# Patient Record
Sex: FEMALE | HISPANIC OR LATINO
[De-identification: names, ages, dates, MRNs, and addresses within clinical notes are randomized per-mention and may not be internally consistent; named-entity substitution may affect disease eponyms.]

---

## 2023-09-21 ENCOUNTER — APPOINTMENT (OUTPATIENT)
Dept: OBGYN | Facility: CLINIC | Age: 33
End: 2023-09-21
Payer: COMMERCIAL

## 2023-09-21 VITALS
WEIGHT: 173 LBS | HEART RATE: 67 BPM | BODY MASS INDEX: 33.96 KG/M2 | HEIGHT: 60 IN | DIASTOLIC BLOOD PRESSURE: 72 MMHG | SYSTOLIC BLOOD PRESSURE: 105 MMHG

## 2023-09-21 DIAGNOSIS — Z78.9 OTHER SPECIFIED HEALTH STATUS: ICD-10-CM

## 2023-09-21 DIAGNOSIS — N83.202 UNSPECIFIED OVARIAN CYST, LEFT SIDE: ICD-10-CM

## 2023-09-21 PROBLEM — Z00.00 ENCOUNTER FOR PREVENTIVE HEALTH EXAMINATION: Status: ACTIVE | Noted: 2023-09-21

## 2023-09-21 PROCEDURE — 99204 OFFICE O/P NEW MOD 45 MIN: CPT

## 2023-09-25 LAB
CANCER AG125 SERPL-ACNC: 14 U/ML
CANCER AG19-9 SERPL-ACNC: 2 U/ML
CEA SERPL-MCNC: <0.6 NG/ML

## 2023-09-27 LAB — HE4X: 44.3 PMOL/L

## 2024-02-07 ENCOUNTER — APPOINTMENT (OUTPATIENT)
Dept: OBGYN | Facility: CLINIC | Age: 34
End: 2024-02-07

## 2024-02-09 ENCOUNTER — NON-APPOINTMENT (OUTPATIENT)
Age: 34
End: 2024-02-09

## 2024-02-23 ENCOUNTER — APPOINTMENT (OUTPATIENT)
Dept: OBGYN | Facility: HOSPITAL | Age: 34
End: 2024-02-23

## 2024-03-14 ENCOUNTER — APPOINTMENT (OUTPATIENT)
Dept: OBGYN | Facility: CLINIC | Age: 34
End: 2024-03-14

## 2025-03-07 ENCOUNTER — EMERGENCY (EMERGENCY)
Facility: HOSPITAL | Age: 35
LOS: 1 days | Discharge: HOME | End: 2025-03-07
Attending: EMERGENCY MEDICINE
Payer: MEDICAID

## 2025-03-07 VITALS
SYSTOLIC BLOOD PRESSURE: 132 MMHG | TEMPERATURE: 97 F | HEART RATE: 72 BPM | WEIGHT: 169.98 LBS | RESPIRATION RATE: 16 BRPM | DIASTOLIC BLOOD PRESSURE: 76 MMHG | OXYGEN SATURATION: 97 %

## 2025-03-07 VITALS
RESPIRATION RATE: 18 BRPM | HEART RATE: 78 BPM | TEMPERATURE: 98 F | SYSTOLIC BLOOD PRESSURE: 117 MMHG | DIASTOLIC BLOOD PRESSURE: 85 MMHG | OXYGEN SATURATION: 98 %

## 2025-03-07 DIAGNOSIS — Z98.890 OTHER SPECIFIED POSTPROCEDURAL STATES: Chronic | ICD-10-CM

## 2025-03-07 LAB
ALBUMIN SERPL ELPH-MCNC: 3.9 G/DL — SIGNIFICANT CHANGE UP (ref 3.5–5)
ALP SERPL-CCNC: 72 U/L — SIGNIFICANT CHANGE UP (ref 40–120)
ALT FLD-CCNC: 65 U/L DA — HIGH (ref 10–60)
ANION GAP SERPL CALC-SCNC: 7 MMOL/L — SIGNIFICANT CHANGE UP (ref 5–17)
AST SERPL-CCNC: 26 U/L — SIGNIFICANT CHANGE UP (ref 10–40)
BASOPHILS # BLD AUTO: 0.02 K/UL — SIGNIFICANT CHANGE UP (ref 0–0.2)
BASOPHILS NFR BLD AUTO: 0.3 % — SIGNIFICANT CHANGE UP (ref 0–2)
BILIRUB SERPL-MCNC: 0.3 MG/DL — SIGNIFICANT CHANGE UP (ref 0.2–1.2)
BLD GP AB SCN SERPL QL: SIGNIFICANT CHANGE UP
BUN SERPL-MCNC: 9 MG/DL — SIGNIFICANT CHANGE UP (ref 7–18)
CALCIUM SERPL-MCNC: 9 MG/DL — SIGNIFICANT CHANGE UP (ref 8.4–10.5)
CHLORIDE SERPL-SCNC: 105 MMOL/L — SIGNIFICANT CHANGE UP (ref 96–108)
CO2 SERPL-SCNC: 26 MMOL/L — SIGNIFICANT CHANGE UP (ref 22–31)
CREAT SERPL-MCNC: 0.51 MG/DL — SIGNIFICANT CHANGE UP (ref 0.5–1.3)
EGFR: 126 ML/MIN/1.73M2 — SIGNIFICANT CHANGE UP
EOSINOPHIL # BLD AUTO: 0.23 K/UL — SIGNIFICANT CHANGE UP (ref 0–0.5)
EOSINOPHIL NFR BLD AUTO: 3 % — SIGNIFICANT CHANGE UP (ref 0–6)
GLUCOSE SERPL-MCNC: 111 MG/DL — HIGH (ref 70–99)
HCG SERPL-ACNC: HIGH MIU/ML
HCT VFR BLD CALC: 36.4 % — SIGNIFICANT CHANGE UP (ref 34.5–45)
HGB BLD-MCNC: 12 G/DL — SIGNIFICANT CHANGE UP (ref 11.5–15.5)
IMM GRANULOCYTES NFR BLD AUTO: 0.3 % — SIGNIFICANT CHANGE UP (ref 0–0.9)
LYMPHOCYTES # BLD AUTO: 1.7 K/UL — SIGNIFICANT CHANGE UP (ref 1–3.3)
LYMPHOCYTES # BLD AUTO: 22.1 % — SIGNIFICANT CHANGE UP (ref 13–44)
MCHC RBC-ENTMCNC: 28.6 PG — SIGNIFICANT CHANGE UP (ref 27–34)
MCHC RBC-ENTMCNC: 33 G/DL — SIGNIFICANT CHANGE UP (ref 32–36)
MCV RBC AUTO: 86.7 FL — SIGNIFICANT CHANGE UP (ref 80–100)
MONOCYTES # BLD AUTO: 0.57 K/UL — SIGNIFICANT CHANGE UP (ref 0–0.9)
MONOCYTES NFR BLD AUTO: 7.4 % — SIGNIFICANT CHANGE UP (ref 2–14)
NEUTROPHILS # BLD AUTO: 5.16 K/UL — SIGNIFICANT CHANGE UP (ref 1.8–7.4)
NEUTROPHILS NFR BLD AUTO: 66.9 % — SIGNIFICANT CHANGE UP (ref 43–77)
NRBC BLD AUTO-RTO: 0 /100 WBCS — SIGNIFICANT CHANGE UP (ref 0–0)
PLATELET # BLD AUTO: 195 K/UL — SIGNIFICANT CHANGE UP (ref 150–400)
POTASSIUM SERPL-MCNC: 3.8 MMOL/L — SIGNIFICANT CHANGE UP (ref 3.5–5.3)
POTASSIUM SERPL-SCNC: 3.8 MMOL/L — SIGNIFICANT CHANGE UP (ref 3.5–5.3)
PROT SERPL-MCNC: 7.6 G/DL — SIGNIFICANT CHANGE UP (ref 6–8.3)
RBC # BLD: 4.2 M/UL — SIGNIFICANT CHANGE UP (ref 3.8–5.2)
RBC # FLD: 12.8 % — SIGNIFICANT CHANGE UP (ref 10.3–14.5)
SODIUM SERPL-SCNC: 138 MMOL/L — SIGNIFICANT CHANGE UP (ref 135–145)
WBC # BLD: 7.7 K/UL — SIGNIFICANT CHANGE UP (ref 3.8–10.5)
WBC # FLD AUTO: 7.7 K/UL — SIGNIFICANT CHANGE UP (ref 3.8–10.5)

## 2025-03-07 PROCEDURE — 76817 TRANSVAGINAL US OBSTETRIC: CPT | Mod: 26

## 2025-03-07 PROCEDURE — 86900 BLOOD TYPING SEROLOGIC ABO: CPT

## 2025-03-07 PROCEDURE — 76801 OB US < 14 WKS SINGLE FETUS: CPT

## 2025-03-07 PROCEDURE — 86901 BLOOD TYPING SEROLOGIC RH(D): CPT

## 2025-03-07 PROCEDURE — 76817 TRANSVAGINAL US OBSTETRIC: CPT

## 2025-03-07 PROCEDURE — 36415 COLL VENOUS BLD VENIPUNCTURE: CPT

## 2025-03-07 PROCEDURE — 85025 COMPLETE CBC W/AUTO DIFF WBC: CPT

## 2025-03-07 PROCEDURE — 80053 COMPREHEN METABOLIC PANEL: CPT

## 2025-03-07 PROCEDURE — 99284 EMERGENCY DEPT VISIT MOD MDM: CPT

## 2025-03-07 PROCEDURE — 86850 RBC ANTIBODY SCREEN: CPT

## 2025-03-07 PROCEDURE — 99284 EMERGENCY DEPT VISIT MOD MDM: CPT | Mod: 25

## 2025-03-07 PROCEDURE — 76801 OB US < 14 WKS SINGLE FETUS: CPT | Mod: 26

## 2025-03-07 PROCEDURE — 84702 CHORIONIC GONADOTROPIN TEST: CPT

## 2025-03-07 NOTE — ED PROVIDER NOTE - CHPI ED SYMPTOMS NEG
no abdominal pain/no back pain/no discharge/no dysuria/no fever/no nausea/no pain/no vaginal discharge/no chills

## 2025-03-07 NOTE — ED PROVIDER NOTE - OBJECTIVE STATEMENT
Patient is a 34-year-old female who was told 2 weeks ago that she is pregnant no sonogram was done at that time patient now complaining of vaginal bleeding passed a clot no pain currently no nausea no vomiting patient is  1 para 0.  History of of excision of ovarian cyst

## 2025-03-07 NOTE — ED PROVIDER NOTE - CLINICAL SUMMARY MEDICAL DECISION MAKING FREE TEXT BOX
Patient states that she was pregnant patient now complaining of vaginal bleeding mild no cramps no nausea no vomiting will obtain CBC CMP type and screen will check pregnancy test will reevaluate after pregnancy test obtain Patient states that she was pregnant patient now complaining of vaginal bleeding mild no cramps no nausea no vomiting will obtain CBC CMP type and screen will check pregnancy test will reevaluate after pregnancy test obtain. patient is pregnant will get ultrasound

## 2025-03-07 NOTE — ED PROVIDER NOTE - NSFOLLOWUPINSTRUCTIONS_ED_ALL_ED_FT
follow up with ob gyn        Log Out.  [employer] CareNotes®  :  [facility]        ACUTE DIARRHEA - Ambulatory Care    Diarrea aguda    CUIDADO AMBULATORIO:    La diarrea agudacomienza rápidamente y dura poco tiempo, usualmente de 1 a 3 días. Puede durar hasta 2 semanas.    Signos y síntomas que pueden ocurrir con la diarrea:Que tenga 3 o más episodios de diarrea. Puede ser difícil de controlar quiroz diarrea. Usted también podría presentar alguno de los siguientes:    Fiebre y escalofríos    Dolor de reginaldo o dolor abdominal    Náuseas y vómitos    Síntomas de deshidratación aayush sed, disminución de la orina, piel seca, ojos hundidos, o latido cardíaco rápido y alan  Busque atención médica de inmediato si:    Se siente confundido.    Quiroz ritmo cardíaco es más rápido de lo normal.    Yasmin ojos se danny demasiado hundidos o no le salen lágrimas cuando llora.    Usted orina menos de lo normal o quiroz orina es de color amarillo oscuro.    Usted tiene bethany o mucosidad en yasmin movimientos intestinales.    Usted tiene dolor abdominal intenso.    Usted no puede jensen ningún líquido.  Comuníquese con quiroz médico si:    Yasmin síntomas no mejoran con el tratamiento.    Tiene fiebre que supera los 101.3°F (38.5°C).    Tiene dificultad para ingerir alimentos y líquidos porque tiene vómitos.    Quiroz diarrea no mejora dentro de 7 días.    Usted tiene preguntas o inquietudes acerca de quiroz condición o cuidado.  El tratamiento para la diarrea agudapuede incluir medicamentos para disminuir o detener quiroz diarrea. También es probable que usted necesite medicamentos para tratar mignon infección.    Cuidados personales:    [place] líquidos aayush se le haya indicado.Los líquidos evitarán la deshidratación que es provocada por la diarrea. Pregunte a quiroz médico sobre la cantidad de líquido que necesita jensen todos los naveen y cuáles le recomienda. Es posible que necesite jensen mignon solución de rehidratación oral (SRO). Mignon SRO tiene las cantidades exactas de agua, sal y azúcar que usted necesita para reemplazar los líquidos corporales. Se pueden comprar soluciones de rehidratación oral en la mayoría de los supermercados y farmacias.    Coma alimentos que reyna fáciles de digerir.Algunos ejemplos incluyen arroz, lentejas, cereales, plátanos, patatas y pan. También se incluyen algunas frutas (plátano, melón), verduras khang cocidas y yolanda magras. No coma alimentos ricos en fibra, grasa y azúcar. Además, no rachelle alcohol hasta que la diarrea haya desaparecido.  Prevenga la diarrea:    Lávese las gurmeet frecuentemente.Utilice agua y jabón. Lávese las gurmeet antes de comer o preparar alimentos. También lávese yasmin gurmeet después del ir al baño. Utilice alcohol en gel si no tiene agua y jabón.  Lavado de gurmeet      Mantenga las superficies del baño limpias.[place] ayuda a evitar la propagación de gérmenes que provocan la diarrea aguda.    Lave khang las frutas y verduras antes de consumirlas.[place] puede ayudar a eliminar los gérmenes causantes de diarrea. Si es posible, retire la piel de frutas y verduras o cocínelas khang antes de comerlas.    Cocine las yolanda y las aves de yin aayush se indica.La carne incluye la carne de res y de cerdo. Las aves de yin incluyen asif, pavo y chirag.  Cocine la carne picada a 160 °F.    Cocine la carne de ave picada, la carne de ave entera o los amaya de carne de ave a 165 °F aayush mínimo. Retire la carne del jaqui. Deje reposar larisa 3 minutos antes de comerla.    Cocine los amaya enteros de carne que no sea de ave a 145 °F aayush mínimo. Retire la carne del jaqui. Deje reposar larisa 3 minutos antes de comerla.    Lave los platos que tocaron la carne cruda con [place] con jabón.[place] incluye tablas de cortar, utensilios, platos y recipientes.    Coloque la carne cruda o cocida en el refrigerador tan pronto aayush sea posible.Las bacterias pueden crecer en la carne que permanece a temperatura ambiente larisa mucho tiempo.    No coma ostras, almejas o mejillones crudos o poco cocidos.Estos alimentos pueden estar contaminados y causar infección.    Rachelle solamente agua filtrada o tratada solo cuando viaja.No ponga hielo en yasmin bebidas. Rachelle agua embotellada siempre que sea posible.  Acuda a la consulta de control con quiroz médico según las indicaciones:Anote yasmin preguntas para que se acuerde de hacerlas larisa yasmin visitas.    © [employer] 1973, 2025    	  back to top            © [employer] 1973, 2025

## 2025-03-07 NOTE — ED ADULT NURSE NOTE - NSFALLUNIVINTERV_ED_ALL_ED
Bed/Stretcher in lowest position, wheels locked, appropriate side rails in place/Call bell, personal items and telephone in reach/Instruct patient to call for assistance before getting out of bed/chair/stretcher/Non-slip footwear applied when patient is off stretcher/[place] to call system/Physically safe environment - no spills, clutter or unnecessary equipment/Purposeful proactive rounding/Room/bathroom lighting operational, light cord in reach

## 2025-03-07 NOTE — ED PROVIDER NOTE - PATIENT PORTAL LINK FT
You can access the FollowMyHealth Patient Portal offered by [facility] by registering at the following website: [email or web address]. By joining "[employer]’s FollowMyHealth portal, you will also be able to view your health information using other applications (apps) compatible with our system.

## 2025-04-28 PROBLEM — Z78.9 OTHER SPECIFIED HEALTH STATUS: Chronic | Status: ACTIVE | Noted: 2025-03-07

## 2025-05-06 ENCOUNTER — EMERGENCY (EMERGENCY)
Facility: HOSPITAL | Age: 35
LOS: 1 days | End: 2025-05-06
Attending: STUDENT IN AN ORGANIZED HEALTH CARE EDUCATION/TRAINING PROGRAM
Payer: MEDICAID

## 2025-05-06 VITALS
HEART RATE: 68 BPM | RESPIRATION RATE: 18 BRPM | DIASTOLIC BLOOD PRESSURE: 65 MMHG | TEMPERATURE: 98 F | OXYGEN SATURATION: 99 % | SYSTOLIC BLOOD PRESSURE: 99 MMHG

## 2025-05-06 VITALS
HEART RATE: 76 BPM | TEMPERATURE: 97 F | WEIGHT: 157.41 LBS | DIASTOLIC BLOOD PRESSURE: 71 MMHG | OXYGEN SATURATION: 99 % | SYSTOLIC BLOOD PRESSURE: 106 MMHG | RESPIRATION RATE: 18 BRPM

## 2025-05-06 DIAGNOSIS — Z98.890 OTHER SPECIFIED POSTPROCEDURAL STATES: Chronic | ICD-10-CM

## 2025-05-06 LAB
ALBUMIN SERPL ELPH-MCNC: 3.2 G/DL — LOW (ref 3.5–5)
ALP SERPL-CCNC: 59 U/L — SIGNIFICANT CHANGE UP (ref 40–120)
ALT FLD-CCNC: 62 U/L DA — HIGH (ref 10–60)
ANION GAP SERPL CALC-SCNC: 8 MMOL/L — SIGNIFICANT CHANGE UP (ref 5–17)
AST SERPL-CCNC: 35 U/L — SIGNIFICANT CHANGE UP (ref 10–40)
BASOPHILS # BLD AUTO: 0.02 K/UL — SIGNIFICANT CHANGE UP (ref 0–0.2)
BASOPHILS NFR BLD AUTO: 0.3 % — SIGNIFICANT CHANGE UP (ref 0–2)
BILIRUB DIRECT SERPL-MCNC: 0.1 MG/DL — SIGNIFICANT CHANGE UP (ref 0–0.3)
BILIRUB INDIRECT FLD-MCNC: 0.4 MG/DL — SIGNIFICANT CHANGE UP (ref 0.2–1)
BILIRUB SERPL-MCNC: 0.5 MG/DL — SIGNIFICANT CHANGE UP (ref 0.2–1.2)
BUN SERPL-MCNC: 8 MG/DL — SIGNIFICANT CHANGE UP (ref 7–18)
CALCIUM SERPL-MCNC: 9.3 MG/DL — SIGNIFICANT CHANGE UP (ref 8.4–10.5)
CHLORIDE SERPL-SCNC: 104 MMOL/L — SIGNIFICANT CHANGE UP (ref 96–108)
CO2 SERPL-SCNC: 24 MMOL/L — SIGNIFICANT CHANGE UP (ref 22–31)
CREAT SERPL-MCNC: 0.48 MG/DL — LOW (ref 0.5–1.3)
EGFR: 127 ML/MIN/1.73M2 — SIGNIFICANT CHANGE UP
EGFR: 127 ML/MIN/1.73M2 — SIGNIFICANT CHANGE UP
EOSINOPHIL # BLD AUTO: 0.29 K/UL — SIGNIFICANT CHANGE UP (ref 0–0.5)
EOSINOPHIL NFR BLD AUTO: 4.2 % — SIGNIFICANT CHANGE UP (ref 0–6)
GLUCOSE SERPL-MCNC: 85 MG/DL — SIGNIFICANT CHANGE UP (ref 70–99)
HCG SERPL-ACNC: HIGH MIU/ML
HCT VFR BLD CALC: 32.8 % — LOW (ref 34.5–45)
HGB BLD-MCNC: 10.8 G/DL — LOW (ref 11.5–15.5)
IMM GRANULOCYTES NFR BLD AUTO: 0.3 % — SIGNIFICANT CHANGE UP (ref 0–0.9)
LYMPHOCYTES # BLD AUTO: 1.19 K/UL — SIGNIFICANT CHANGE UP (ref 1–3.3)
LYMPHOCYTES # BLD AUTO: 17.4 % — SIGNIFICANT CHANGE UP (ref 13–44)
MCHC RBC-ENTMCNC: 28.6 PG — SIGNIFICANT CHANGE UP (ref 27–34)
MCHC RBC-ENTMCNC: 32.9 G/DL — SIGNIFICANT CHANGE UP (ref 32–36)
MCV RBC AUTO: 87 FL — SIGNIFICANT CHANGE UP (ref 80–100)
MONOCYTES # BLD AUTO: 0.55 K/UL — SIGNIFICANT CHANGE UP (ref 0–0.9)
MONOCYTES NFR BLD AUTO: 8 % — SIGNIFICANT CHANGE UP (ref 2–14)
NEUTROPHILS # BLD AUTO: 4.77 K/UL — SIGNIFICANT CHANGE UP (ref 1.8–7.4)
NEUTROPHILS NFR BLD AUTO: 69.8 % — SIGNIFICANT CHANGE UP (ref 43–77)
NRBC BLD AUTO-RTO: 0 /100 WBCS — SIGNIFICANT CHANGE UP (ref 0–0)
PLATELET # BLD AUTO: 165 K/UL — SIGNIFICANT CHANGE UP (ref 150–400)
POTASSIUM SERPL-MCNC: 3.5 MMOL/L — SIGNIFICANT CHANGE UP (ref 3.5–5.3)
POTASSIUM SERPL-SCNC: 3.5 MMOL/L — SIGNIFICANT CHANGE UP (ref 3.5–5.3)
PROT SERPL-MCNC: 7 G/DL — SIGNIFICANT CHANGE UP (ref 6–8.3)
RBC # BLD: 3.77 M/UL — LOW (ref 3.8–5.2)
RBC # FLD: 13.3 % — SIGNIFICANT CHANGE UP (ref 10.3–14.5)
SODIUM SERPL-SCNC: 136 MMOL/L — SIGNIFICANT CHANGE UP (ref 135–145)
WBC # BLD: 6.84 K/UL — SIGNIFICANT CHANGE UP (ref 3.8–10.5)
WBC # FLD AUTO: 6.84 K/UL — SIGNIFICANT CHANGE UP (ref 3.8–10.5)

## 2025-05-06 PROCEDURE — 84702 CHORIONIC GONADOTROPIN TEST: CPT

## 2025-05-06 PROCEDURE — 36415 COLL VENOUS BLD VENIPUNCTURE: CPT

## 2025-05-06 PROCEDURE — 96374 THER/PROPH/DIAG INJ IV PUSH: CPT

## 2025-05-06 PROCEDURE — 82962 GLUCOSE BLOOD TEST: CPT

## 2025-05-06 PROCEDURE — 85025 COMPLETE CBC W/AUTO DIFF WBC: CPT

## 2025-05-06 PROCEDURE — 80076 HEPATIC FUNCTION PANEL: CPT

## 2025-05-06 PROCEDURE — 99284 EMERGENCY DEPT VISIT MOD MDM: CPT | Mod: 25

## 2025-05-06 PROCEDURE — 99284 EMERGENCY DEPT VISIT MOD MDM: CPT

## 2025-05-06 PROCEDURE — 76805 OB US >/= 14 WKS SNGL FETUS: CPT | Mod: 26

## 2025-05-06 PROCEDURE — 76805 OB US >/= 14 WKS SNGL FETUS: CPT

## 2025-05-06 PROCEDURE — 80048 BASIC METABOLIC PNL TOTAL CA: CPT

## 2025-05-06 RX ORDER — ONDANSETRON HCL/PF 4 MG/2 ML
1 VIAL (ML) INJECTION
Qty: 120 | Refills: 0
Start: 2025-05-06 | End: 2025-06-04

## 2025-05-06 RX ADMIN — Medication 20 MILLIGRAM(S): at 18:39

## 2025-05-06 RX ADMIN — Medication 1000 MILLILITER(S): at 18:38

## 2025-05-06 NOTE — ED PROVIDER NOTE - NSFOLLOWUPINSTRUCTIONS_ED_ALL_ED_FT
Nausea and vomiting (often called morning sickness) are common in pregnancy. They are likely caused by pregnancy hormones and happen most often in the first several months. Some people get very sick and are not able to keep down food and fluids. This extreme morning sickness is called hyperemesis gravidarum. It can lead to a dangerous loss of fluids in the body. It also can keep you from gaining weight and getting proper nutrition during your pregnancy.    There are some things you can do to take care of yourself and that may help your symptoms. Medicine may help if you have severe nausea and vomiting.    Take your medicines exactly as prescribed. Call your doctor, midwife, or nurse advice line if you think you are having a problem with your medicine.    Drink plenty of fluids to prevent dehydration. Choose water and other clear liquids until you feel better. Try using an electrolyte replacement drink (such as Pedialyte) to replace fluids and minerals.    For nausea when you wake up, eat a small snack such as crackers before rising. Wait a few minutes, then slowly get up.    Keep food in your stomach, but not too much at once. An empty stomach can make nausea worse. Eat several small meals every day instead of three large meals.    Ask your doctor or midwife about medicines that may help relieve your symptoms.    Try to avoid smells and foods that make you feel sick to your stomach. High-fat or greasy foods, milk, and coffee may make nausea worse. Some foods that may be easier to tolerate include cold, spicy, sour, and salty foods.    Get lots of rest. Stress and fatigue can make your morning sickness worse.    Try foods and drinks made with ginger, such as ginger tea, ginger ale, and crystallized ginger. Ginger may help with nausea.

## 2025-05-06 NOTE — ED ADULT NURSE NOTE - NSFALLUNIVINTERV_ED_ALL_ED
Bed/Stretcher in lowest position, wheels locked, appropriate side rails in place/Call bell, personal items and telephone in reach/Instruct patient to call for assistance before getting out of bed/chair/stretcher/Non-slip footwear applied when patient is off stretcher/[place] to call system/Physically safe environment - no spills, clutter or unnecessary equipment/Purposeful proactive rounding/Room/bathroom lighting operational, light cord in reach

## 2025-05-06 NOTE — ED PROVIDER NOTE - CLINICAL SUMMARY MEDICAL DECISION MAKING FREE TEXT BOX
34 PMH/PSH p/w n/v in context of pregnancy, already had US confirming IUP per pt. Complaning of emesis nb/nb.  Denies other infectious sx suggesting focal infection. Also denies dysuria, denies vaginal discharge or vaginal bleeding.    ED COURSE:  Initial Impression/Empiric management: Suspected hyperemesis gravidarum  Diagnostically:  Preg test positive  No electrolyte derangements.  PO trial – pt tolerated POs after treatment.  Therapeutically: Patient given Reglan 10mg IVPB.  ED Course/Reevaluation: Tolerated POs. Repeat abd exam benign.    Assessment/MDM/Plan:  Presentation is most consistent with hyperemesis gravidarum    DDx – Considered the following diagnoses though based upon evaluation, pt does not meet reasonable level of consistency with characteristic findings/likelihood for further pursuit at this time (risks/outweigh benefits of non-indicated testing). Shared decision making with with pt attempted to the extent possible.    Disposition: home w/ prompt OB follow-up.

## 2025-05-06 NOTE — ED PROVIDER NOTE - PHYSICAL EXAMINATION
GENERAL APPEARANCE:  AAOx3, generally well-appearing, no acute distress. Appears stated age.  HEENT:  NCAT. Moist mucous membranes. EOMI, clear conjunctiva, no slceral icterus, oropharynx clear.  NECK:  Supple without lymphadenopathy. No JVD.  No neck stiffness or restricted ROM.  HEART:  Normal heart rate and regular rhythm. No murmur.   LUNGS:  CTAB, moving air well. No crackles or wheezes are heard.  ABDOMEN:  Gravid, non-tender abdomen. No rebound or guarding.  CHEST/BACK: Chest wall non-tender. No CVAT, or midline cervical/thoracic/lumbar tenderness to palpation. No obvious deformity of chest wall or back.  EXTREMITIES:  Without cyanosis, clubbing or edema. Pulse intact x 4. FROM x4. Compartments soft in all extremities.  NEUROLOGICAL:  Grossly non-focal. Alert and oriented, moving all 4 extremities. CN II-XII grossly intact. Observed to ambulate with normal gait.  SKIN:  Warm and dry without any rash.  PSYCH: Calm, cooperative. Normal mood and affect. Demonstrates proper insight and judgement.

## 2025-05-06 NOTE — ED ADULT TRIAGE NOTE - CHIEF COMPLAINT QUOTE
Patient reports dizziness, left lower abdominal pain with vomiting for 4 days. Patient is 16 weeks pregnant, LMP 1/20/25, due date 10/15/2025, first pregnancy, denies vaginal bleeding.

## 2025-05-06 NOTE — ED PROVIDER NOTE - PATIENT PORTAL LINK FT
You can access the FollowMyHealth Patient Portal offered by [facility] by registering at the following website: [email or web address]. By joining [employer]’s FollowMyHealth portal, you will also be able to view your health information using other applications (apps) compatible with our system.

## 2025-05-06 NOTE — ED PROVIDER NOTE - OBJECTIVE STATEMENT
34-year-old female presents reporting multiple days of nausea and vomiting.  Patient reports she is currently 16 weeks pregnant with a confirmed IUP.  Patient states outpatient GYN prescribed her vitamins and doxylamine for nausea and sleep.  States that the medication was working until recently.  Reports multiple episodes of vomiting and inability to eat.  Denies fevers or diarrhea vaginal bleeding or discharge.

## 2025-05-29 ENCOUNTER — ASOB RESULT (OUTPATIENT)
Age: 35
End: 2025-05-29

## 2025-05-29 ENCOUNTER — APPOINTMENT (OUTPATIENT)
Dept: ANTEPARTUM | Facility: CLINIC | Age: 35
End: 2025-05-29
Payer: MEDICAID

## 2025-05-29 PROCEDURE — 76811 OB US DETAILED SNGL FETUS: CPT

## 2025-05-29 PROCEDURE — 76817 TRANSVAGINAL US OBSTETRIC: CPT

## 2025-06-12 ENCOUNTER — ASOB RESULT (OUTPATIENT)
Age: 35
End: 2025-06-12

## 2025-06-12 ENCOUNTER — APPOINTMENT (OUTPATIENT)
Dept: ANTEPARTUM | Facility: CLINIC | Age: 35
End: 2025-06-12
Payer: MEDICAID

## 2025-06-12 PROCEDURE — 76816 OB US FOLLOW-UP PER FETUS: CPT

## 2025-06-12 PROCEDURE — 99204 OFFICE O/P NEW MOD 45 MIN: CPT | Mod: TH,25

## 2025-07-10 ENCOUNTER — APPOINTMENT (OUTPATIENT)
Dept: ANTEPARTUM | Facility: CLINIC | Age: 35
End: 2025-07-10
Payer: MEDICAID

## 2025-07-10 ENCOUNTER — ASOB RESULT (OUTPATIENT)
Age: 35
End: 2025-07-10

## 2025-07-10 PROCEDURE — 76816 OB US FOLLOW-UP PER FETUS: CPT

## 2025-08-07 ENCOUNTER — APPOINTMENT (OUTPATIENT)
Dept: ANTEPARTUM | Facility: CLINIC | Age: 35
End: 2025-08-07
Payer: MEDICAID

## 2025-08-07 ENCOUNTER — ASOB RESULT (OUTPATIENT)
Age: 35
End: 2025-08-07

## 2025-08-07 PROCEDURE — 76816 OB US FOLLOW-UP PER FETUS: CPT

## 2025-08-07 PROCEDURE — 76819 FETAL BIOPHYS PROFIL W/O NST: CPT | Mod: 59

## 2025-08-21 ENCOUNTER — ASOB RESULT (OUTPATIENT)
Age: 35
End: 2025-08-21

## 2025-08-21 ENCOUNTER — APPOINTMENT (OUTPATIENT)
Dept: ANTEPARTUM | Facility: CLINIC | Age: 35
End: 2025-08-21
Payer: MEDICAID

## 2025-08-21 PROCEDURE — 76818 FETAL BIOPHYS PROFILE W/NST: CPT

## 2025-08-21 PROCEDURE — ZZZZZ: CPT

## 2025-08-28 ENCOUNTER — APPOINTMENT (OUTPATIENT)
Dept: ANTEPARTUM | Facility: CLINIC | Age: 35
End: 2025-08-28
Payer: MEDICAID

## 2025-08-28 ENCOUNTER — ASOB RESULT (OUTPATIENT)
Age: 35
End: 2025-08-28

## 2025-08-28 PROCEDURE — 76818 FETAL BIOPHYS PROFILE W/NST: CPT | Mod: 59

## 2025-08-28 PROCEDURE — 76816 OB US FOLLOW-UP PER FETUS: CPT

## 2025-08-28 PROCEDURE — ZZZZZ: CPT

## 2025-09-03 ENCOUNTER — APPOINTMENT (OUTPATIENT)
Dept: MATERNAL FETAL MEDICINE | Facility: CLINIC | Age: 35
End: 2025-09-03

## 2025-09-03 ENCOUNTER — ASOB RESULT (OUTPATIENT)
Age: 35
End: 2025-09-03

## 2025-09-03 PROCEDURE — G0109 DIAB MANAGE TRN IND/GROUP: CPT | Mod: 95

## 2025-09-04 ENCOUNTER — ASOB RESULT (OUTPATIENT)
Age: 35
End: 2025-09-04

## 2025-09-04 ENCOUNTER — APPOINTMENT (OUTPATIENT)
Dept: ANTEPARTUM | Facility: CLINIC | Age: 35
End: 2025-09-04
Payer: MEDICAID

## 2025-09-04 PROCEDURE — 76818 FETAL BIOPHYS PROFILE W/NST: CPT

## 2025-09-04 PROCEDURE — ZZZZZ: CPT

## 2025-09-05 ENCOUNTER — APPOINTMENT (OUTPATIENT)
Dept: ANTEPARTUM | Facility: CLINIC | Age: 35
End: 2025-09-05

## 2025-09-11 ENCOUNTER — APPOINTMENT (OUTPATIENT)
Dept: ANTEPARTUM | Facility: CLINIC | Age: 35
End: 2025-09-11

## 2025-09-18 ENCOUNTER — ASOB RESULT (OUTPATIENT)
Age: 35
End: 2025-09-18

## 2025-09-18 ENCOUNTER — APPOINTMENT (OUTPATIENT)
Dept: ANTEPARTUM | Facility: CLINIC | Age: 35
End: 2025-09-18
Payer: MEDICAID

## 2025-09-18 PROCEDURE — 76818 FETAL BIOPHYS PROFILE W/NST: CPT | Mod: 59

## 2025-09-18 PROCEDURE — ZZZZZ: CPT

## 2025-09-18 PROCEDURE — 76816 OB US FOLLOW-UP PER FETUS: CPT

## 2025-09-18 PROCEDURE — 99212 OFFICE O/P EST SF 10 MIN: CPT | Mod: TH,25

## 2025-09-19 ENCOUNTER — ASOB RESULT (OUTPATIENT)
Age: 35
End: 2025-09-19

## 2025-09-19 ENCOUNTER — APPOINTMENT (OUTPATIENT)
Dept: MATERNAL FETAL MEDICINE | Facility: CLINIC | Age: 35
End: 2025-09-19